# Patient Record
Sex: MALE | Race: WHITE | NOT HISPANIC OR LATINO | Employment: FULL TIME | URBAN - METROPOLITAN AREA
[De-identification: names, ages, dates, MRNs, and addresses within clinical notes are randomized per-mention and may not be internally consistent; named-entity substitution may affect disease eponyms.]

---

## 2017-05-18 ENCOUNTER — ALLSCRIPTS OFFICE VISIT (OUTPATIENT)
Dept: OTHER | Facility: OTHER | Age: 57
End: 2017-05-18

## 2018-01-13 VITALS
HEIGHT: 65 IN | WEIGHT: 140 LBS | HEART RATE: 90 BPM | OXYGEN SATURATION: 98 % | DIASTOLIC BLOOD PRESSURE: 88 MMHG | BODY MASS INDEX: 23.32 KG/M2 | SYSTOLIC BLOOD PRESSURE: 124 MMHG

## 2020-02-05 ENCOUNTER — OFFICE VISIT (OUTPATIENT)
Dept: FAMILY MEDICINE CLINIC | Facility: CLINIC | Age: 60
End: 2020-02-05
Payer: COMMERCIAL

## 2020-02-05 VITALS
HEART RATE: 88 BPM | WEIGHT: 137 LBS | SYSTOLIC BLOOD PRESSURE: 120 MMHG | BODY MASS INDEX: 25.21 KG/M2 | DIASTOLIC BLOOD PRESSURE: 80 MMHG | OXYGEN SATURATION: 97 % | TEMPERATURE: 98.1 F | RESPIRATION RATE: 22 BRPM | HEIGHT: 62 IN

## 2020-02-05 DIAGNOSIS — J01.00 ACUTE NON-RECURRENT MAXILLARY SINUSITIS: Primary | ICD-10-CM

## 2020-02-05 PROCEDURE — 3008F BODY MASS INDEX DOCD: CPT | Performed by: NURSE PRACTITIONER

## 2020-02-05 PROCEDURE — 99213 OFFICE O/P EST LOW 20 MIN: CPT | Performed by: NURSE PRACTITIONER

## 2020-02-05 RX ORDER — AZITHROMYCIN 250 MG/1
TABLET, FILM COATED ORAL
Qty: 6 TABLET | Refills: 0 | Status: SHIPPED | OUTPATIENT
Start: 2020-02-05 | End: 2020-02-09

## 2020-02-05 NOTE — PATIENT INSTRUCTIONS
Discussed viral vs bacterial infection  RX as ordered  Continue OTC cough/cold medications as directed  Call or return for problems/concerns    Schedule appointment for Annual physical as discussed

## 2020-02-05 NOTE — PROGRESS NOTES
[facility]        NAME: Isidra Lang is a 61 y o  male  : 1960    MRN: [id number]  DATE: 2020  TIME: 1:13 PM    Assessment and Plan   Acute non-recurrent maxillary sinusitis [J01 00]  1  Acute non-recurrent maxillary sinusitis  azithromycin (ZITHROMAX) 250 mg tablet         Patient Instructions     Patient Instructions   Discussed viral vs bacterial infection  RX as ordered  Continue OTC cough/cold medications as directed  Call or return for problems/concerns    Schedule appointment for Annual physical as discussed          Chief Complaint     Chief Complaint   Patient presents with    Sinus Problem    Cough         History of Present Illness       Sinus pain and pressure, congestion, occasional cough  More on left side x 6 days  Taking Otc cold medications with little relief      Review of Systems   Review of Systems   Constitutional: Negative for activity change, chills, fatigue and fever  HENT: Positive for congestion, postnasal drip, rhinorrhea, sinus pressure and sinus pain  Negative for ear pain and sore throat  Eyes: Negative for pain, discharge and redness  Respiratory: Positive for cough  Negative for wheezing  Cardiovascular: Negative for chest pain  Gastrointestinal: Negative for constipation, diarrhea, nausea and vomiting  Musculoskeletal: Negative for myalgias  Skin: Negative for rash  Neurological: Positive for headaches  Negative for dizziness           Current Medications       Current Outpatient Medications:     azithromycin (ZITHROMAX) 250 mg tablet, Take 2 tablets today then 1 tablet daily x 4 days, Disp: 6 tablet, Rfl: 0    Current Allergies     Allergies as of 2020 - Reviewed 2020   Allergen Reaction Noted    Penicillins Anaphylaxis 2016            The following portions of the patient's history were reviewed and updated as appropriate: allergies, current medications, past family history, past medical history, past social history, past surgical history and problem list      Past Medical History:   Diagnosis Date    Cardiac disease     Coronary artery disease     Hyperlipidemia     Tobacco dependency        Past Surgical History:   Procedure Laterality Date    CORONARY STENT PLACEMENT      HAND FRACTURE REPAIR Right     HERNIA REPAIR      [place] SHLDR ARTHROSCOP,SURG,W/ROTAT CUFF REPR Right 7/29/2016    Procedure: DIAGNOSTIC SHOULDER ARTHROSCOPY;  Surgeon: Jeffrey Domingo MD;  Location: [facility] OR;  Service: Orthopedics       History reviewed  No pertinent family history  Medications have been verified  Objective   /80 (BP Location: Left arm, Patient Position: Sitting, Cuff Size: Large)   Pulse 88   Temp 98 1 °F (36 7 °C) (Tympanic)   Resp 22   Ht 5' 2 21" (1 58 m)   Wt 62 1 kg (137 lb)   SpO2 97%   BMI 24 89 kg/m²        Physical Exam     Physical Exam   Constitutional: He is oriented to person, place, and time  He appears well-developed and well-nourished  No distress  HENT:   Head: Normocephalic and atraumatic  Right Ear: Tympanic membrane, external ear and ear canal normal    Left Ear: Tympanic membrane, external ear and ear canal normal    Nose: Rhinorrhea present  No epistaxis  Right sinus exhibits maxillary sinus tenderness  Left sinus exhibits maxillary sinus tenderness and frontal sinus tenderness  Mouth/Throat: Uvula is midline, oropharynx is clear and moist and mucous membranes are normal    Cardiovascular: Normal rate, regular rhythm and normal heart sounds  Exam reveals no gallop and no friction rub  No murmur heard  Pulmonary/Chest: Effort normal and breath sounds normal  No respiratory distress  He has no wheezes  He has no rales  Abdominal: He exhibits no distension  Musculoskeletal: Normal range of motion  Neurological: He is alert and oriented to person, place, and time  Skin: He is not diaphoretic  Psychiatric: He has a normal mood and affect   His behavior is normal  Judgment and thought content normal    Nursing note and vitals reviewed

## 2020-09-03 ENCOUNTER — OCCMED (OUTPATIENT)
Dept: URGENT CARE | Facility: CLINIC | Age: 60
End: 2020-09-03
Payer: COMMERCIAL
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
## 2020-09-03 PROCEDURE — 90471 IMMUNIZATION ADMIN: CPT | Performed by: PHYSICIAN ASSISTANT

## 2020-09-03 PROCEDURE — 99213 OFFICE O/P EST LOW 20 MIN: CPT | Performed by: PHYSICIAN ASSISTANT

## 2020-09-03 PROCEDURE — 90715 TDAP VACCINE 7 YRS/> IM: CPT

## 2020-09-10 ENCOUNTER — OCCMED (OUTPATIENT)
Dept: URGENT CARE | Facility: CLINIC | Age: 60
End: 2020-09-10
Payer: COMMERCIAL
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
## 2020-09-10 PROCEDURE — 99213 OFFICE O/P EST LOW 20 MIN: CPT

## 2023-06-06 ENCOUNTER — HOSPITAL ENCOUNTER (EMERGENCY)
Facility: HOSPITAL | Age: 63
Discharge: HOME | End: 2023-06-06
Attending: EMERGENCY MEDICINE | Admitting: EMERGENCY MEDICINE
Payer: COMMERCIAL

## 2023-06-06 VITALS
WEIGHT: 151.68 LBS | TEMPERATURE: 97.5 F | HEART RATE: 81 BPM | DIASTOLIC BLOOD PRESSURE: 84 MMHG | OXYGEN SATURATION: 98 % | RESPIRATION RATE: 21 BRPM | BODY MASS INDEX: 26.87 KG/M2 | SYSTOLIC BLOOD PRESSURE: 127 MMHG

## 2023-06-06 DIAGNOSIS — S46.911A STRAIN OF RIGHT SHOULDER, INITIAL ENCOUNTER: ICD-10-CM

## 2023-06-06 DIAGNOSIS — M54.12 CERVICAL RADICULOPATHY: Primary | ICD-10-CM

## 2023-06-06 LAB — GLUCOSE SERPL-MCNC: 126 MG/DL (ref 65–140)

## 2023-06-06 PROCEDURE — 82948 REAGENT STRIP/BLOOD GLUCOSE: CPT

## 2023-06-06 RX ORDER — LIDOCAINE 50 MG/G
1 PATCH TOPICAL ONCE
Status: DISCONTINUED | OUTPATIENT
Start: 2023-06-06 | End: 2023-06-06 | Stop reason: HOSPADM

## 2023-06-06 RX ADMIN — LIDOCAINE 1 PATCH: 50 PATCH TOPICAL at 08:27

## 2023-06-06 NOTE — DISCHARGE INSTRUCTIONS
You can leave the lidocaine patch on the shoulder for up to 12 hours  If it helps you can buy more over-the-counter  Continue Aleve or ibuprofen as directed  You may also take Tylenol as needed for pain  Avoid activities that cause more pain until this is followed up by your Workmen's Comp  team   See them today  Continue light duty until they clear you

## 2023-06-06 NOTE — Clinical Note
Horace Hook was seen and treated in our emergency department on 6/6/2023  Occupational Medicine Follow Up Within 24 hours        No lifting, pulling, pushing greater than 10 pounds until cleared by healthcare provider  Diagnosis: Right shoulder strain with cervical radiculopathy    Jayden Stark  may return to work on return date  He may return on this date: 06/06/2023         If you have any questions or concerns, please don't hesitate to call        Jason Shah, DO    ______________________________           _______________          _______________  Hospital Representative                              Date                                Time

## 2023-06-06 NOTE — ED PROVIDER NOTES
History  Chief Complaint   Patient presents with   • Numbness     Pt arrived to the ER by POV c/o right hand numbness that began yesterday at approximately 09:30 hrs  He also c/o pain in the right side of his head that radiated to his entire arm  He denied any CP, sob, N/V, dizziness and abdominal pain  60-year-old man with remote right rotator cuff injury, had sudden pain in the right neck down the right arm at work yesterday while lifting a bin of screws  He felt the arm was weak afterwards  He was seen briefly by the medical team there  The pain continues but improved with nonsteroidals  He was concerned that this could be stroke since he has pain from the right occiput down the right neck and down the right arm  He is concerned that the arm still feels weak when trying to lift heavy objects  Denies headache, change in vision, vertigo, other focal neurologic problems  Denies vertigo, nausea, chest pain or palpitations  Patient has prior right rotator cuff tear but still has good use of his arm  He has been able to do his normal activities today but just feels there is some weakness and pain from the shoulder when he lifts something too heavy  None       Past Medical History:   Diagnosis Date   • Cardiac disease    • Coronary artery disease    • Hyperlipidemia    • Tobacco dependency        Past Surgical History:   Procedure Laterality Date   • CORONARY STENT PLACEMENT     • HAND FRACTURE REPAIR Right    • HERNIA REPAIR     • [place] SHLDR ARTHROSCOP,SURG,W/ROTAT CUFF REPR Right 7/29/2016    Procedure: DIAGNOSTIC SHOULDER ARTHROSCOPY;  Surgeon: Shannen Agarwal MD;  Location: [facility] OR;  Service: Orthopedics       No family history on file  I have reviewed and agree with the history as documented      E-Cigarette/Vaping     E-Cigarette/Vaping Substances     Social History     Tobacco Use   • Smoking status: Every Day     Packs/day: 0 50     Types: Cigarettes   • Smokeless tobacco: Never   Substance Use Topics   • Alcohol use: No     Comment: rarely   • Drug use: No       Review of Systems   Constitutional: Negative for fatigue and fever  Respiratory: Negative for shortness of breath  Cardiovascular: Negative for chest pain and palpitations  Gastrointestinal: Negative for abdominal pain  Neurological: Negative for dizziness, tremors, seizures, syncope, facial asymmetry, speech difficulty and headaches  Physical Exam  Physical Exam  Vitals and nursing note reviewed  Constitutional:       General: He is not in acute distress  Appearance: He is well-developed and normal weight  He is not ill-appearing or diaphoretic  HENT:      Head: Normocephalic and atraumatic  Right Ear: External ear normal       Left Ear: External ear normal    Eyes:      Conjunctiva/sclera: Conjunctivae normal       Pupils: Pupils are equal, round, and reactive to light  Neck:      Vascular: No carotid bruit or JVD  Cardiovascular:      Rate and Rhythm: Normal rate and regular rhythm  Heart sounds: Normal heart sounds  No murmur heard  Pulmonary:      Effort: Pulmonary effort is normal       Breath sounds: Normal breath sounds  Abdominal:      General: Bowel sounds are normal       Palpations: Abdomen is soft  There is no mass  Tenderness: There is no guarding or rebound  Musculoskeletal:         General: Normal range of motion  Cervical back: Normal range of motion and neck supple  Tenderness ( Mild tenderness right cervical paraspinal muscles  Tnenderness of right trapezikus and rhomboids  ) present  Lymphadenopathy:      Cervical: No cervical adenopathy  Skin:     General: Skin is warm and dry  Findings: No rash  Neurological:      Mental Status: He is alert and oriented to person, place, and time  Cranial Nerves: No cranial nerve deficit  Coordination: Coordination normal       Deep Tendon Reflexes: Reflexes are normal and symmetric     Psychiatric:         Behavior: Behavior normal          Vital Signs  ED Triage Vitals   Temperature Pulse Respirations Blood Pressure SpO2   06/06/23 0757 06/06/23 0757 06/06/23 0753 06/06/23 0757 06/06/23 0757   97 5 °F (36 4 °C) 87 16 143/82 99 %      Temp src Heart Rate Source Patient Position - Orthostatic VS BP Location FiO2 (%)   -- 06/06/23 0757 06/06/23 0800 06/06/23 0800 --    Monitor Lying Right arm       Pain Score       --                  Vitals:    06/06/23 0758 06/06/23 0800 06/06/23 0815 06/06/23 0830   BP:  143/90 137/89 127/84   Pulse: 83 83 81 81   Patient Position - Orthostatic VS:  Lying Lying Lying         Visual Acuity  Visual Acuity    Flowsheet Row Most Recent Value   L Pupil Size (mm) 2   R Pupil Size (mm) 2          ED Medications  Medications - No data to display    Diagnostic Studies  Results Reviewed     Procedure Component Value Units Date/Time    Fingerstick Glucose (POCT) [[id number]]  (Normal) Collected: 06/06/23 0754    Lab Status: Final result Updated: 06/06/23 0758     POC Glucose 126 mg/dl                  No orders to display              Procedures  Procedures         ED Course                               SBIRT 22yo+    Flowsheet Row Most Recent Value   Initial Alcohol Screen: US AUDIT-C     1  How often do you have a drink containing alcohol? 3 Filed at: 06/06/2023 0802   2  How many drinks containing alcohol do you have on a typical day you are drinking? 1 Filed at: 06/06/2023 0802   3a  Male UNDER 65: How often do you have five or more drinks on one occasion? 0 Filed at: 06/06/2023 0802   3b  FEMALE Any Age, or MALE 65+: How often do you have 4 or more drinks on one occassion? 0 Filed at: 06/06/2023 0802   Audit-C Score 4 Filed at: 06/06/2023 6443   MUKESH: How many times in the past year have you    Used an illegal drug or used a prescription medication for non-medical reasons?  Never Filed at: 06/06/2023 6245                    Medical Decision Making  66-year-old male with pain and tenderness of the right rhomboid, trapezius and right cervical paraspinal muscles after lifting heavy object yesterday  Concerned he may have CVA because he has pain running from neck down to the arm with some weakness in the shoulder  He has no loss of  strength or change in sensation to the fingers  I can reproduced tenderness over a trigger point in the right rhomboids  Patient has some tenderness in trapezius and right cervical paraspinal muscles  He does have good distal strength with slight limitation of shoulder flexion  No sign of central lesion  No signs of infection, thrombosis, ischemia  Reflexes are intact  Symptoms likely d/t peripheral nerve compression, muscle spasm  Cervical radiculopathy: acute illness or injury  Strain of right shoulder, initial encounter: acute illness or injury      Disposition  Final diagnoses:   Cervical radiculopathy   Strain of right shoulder, initial encounter     Time reflects when diagnosis was documented in both MDM as applicable and the Disposition within this note     Time User Action Codes Description Comment    6/6/2023  8:16 AM Jim Alejandro Add [M54 12] Cervical radiculopathy     6/6/2023  8:16 AM Jim Alejandro Add [E59 282S] Strain of right shoulder, initial encounter       ED Disposition     ED Disposition   Discharge    Condition   Stable    Date/Time   Tue Jun 6, 2023  8:16 AM    Comment   Daron Dobbins II discharge to home/self care  Follow-up Information     Follow up With Specialties Details Why Contact Info    Your employer's Workman's Comp  Go today            There are no discharge medications for this patient  No discharge procedures on file      PDMP Review     None          ED Provider  Electronically Signed by           Nafisa Dillard DO  06/07/23 1022

## 2024-09-07 ENCOUNTER — APPOINTMENT (EMERGENCY)
Dept: CT IMAGING | Facility: HOSPITAL | Age: 64
End: 2024-09-07
Payer: COMMERCIAL

## 2024-09-07 ENCOUNTER — HOSPITAL ENCOUNTER (EMERGENCY)
Facility: HOSPITAL | Age: 64
Discharge: HOME | End: 2024-09-07
Attending: EMERGENCY MEDICINE
Payer: COMMERCIAL

## 2024-09-07 VITALS
HEART RATE: 76 BPM | DIASTOLIC BLOOD PRESSURE: 83 MMHG | OXYGEN SATURATION: 98 % | TEMPERATURE: 98.3 F | RESPIRATION RATE: 18 BRPM | SYSTOLIC BLOOD PRESSURE: 129 MMHG

## 2024-09-07 DIAGNOSIS — R31.9 HEMATURIA: Primary | ICD-10-CM

## 2024-09-07 DIAGNOSIS — K86.1 CHRONIC PANCREATITIS (HCC): ICD-10-CM

## 2024-09-07 DIAGNOSIS — N40.0 ENLARGED PROSTATE: ICD-10-CM

## 2024-09-07 DIAGNOSIS — N39.0 UTI (URINARY TRACT INFECTION): ICD-10-CM

## 2024-09-07 DIAGNOSIS — K80.20 GALLSTONES: ICD-10-CM

## 2024-09-07 LAB
ALBUMIN SERPL BCG-MCNC: 4.8 G/DL (ref 3.5–5)
ALP SERPL-CCNC: 54 U/L (ref 34–104)
ALT SERPL W P-5'-P-CCNC: 19 U/L (ref 7–52)
ANION GAP SERPL CALCULATED.3IONS-SCNC: 7 MMOL/L (ref 4–13)
AST SERPL W P-5'-P-CCNC: 19 U/L (ref 13–39)
BACTERIA UR QL AUTO: ABNORMAL /HPF
BASOPHILS # BLD AUTO: 0.02 THOUSANDS/ÂΜL (ref 0–0.1)
BASOPHILS NFR BLD AUTO: 0 % (ref 0–1)
BILIRUB SERPL-MCNC: 0.92 MG/DL (ref 0.2–1)
BILIRUB UR QL STRIP: NEGATIVE
BUN SERPL-MCNC: 18 MG/DL (ref 5–25)
CALCIUM SERPL-MCNC: 9.8 MG/DL (ref 8.4–10.2)
CHLORIDE SERPL-SCNC: 107 MMOL/L (ref 96–108)
CLARITY UR: ABNORMAL
CO2 SERPL-SCNC: 25 MMOL/L (ref 21–32)
COLOR UR: YELLOW
CREAT SERPL-MCNC: 0.99 MG/DL (ref 0.6–1.3)
EOSINOPHIL # BLD AUTO: 0.15 THOUSAND/ÂΜL (ref 0–0.61)
EOSINOPHIL NFR BLD AUTO: 2 % (ref 0–6)
ERYTHROCYTE [DISTWIDTH] IN BLOOD BY AUTOMATED COUNT: 13.1 % (ref 11.6–15.1)
GFR SERPL CREATININE-BSD FRML MDRD: 80 ML/MIN/1.73SQ M
GLUCOSE SERPL-MCNC: 93 MG/DL (ref 65–140)
GLUCOSE UR STRIP-MCNC: NEGATIVE MG/DL
HCT VFR BLD AUTO: 46.2 % (ref 36.5–49.3)
HGB BLD-MCNC: 15.7 G/DL (ref 12–17)
HGB UR QL STRIP.AUTO: ABNORMAL
HYALINE CASTS #/AREA URNS LPF: ABNORMAL /LPF
IMM GRANULOCYTES # BLD AUTO: 0.06 THOUSAND/UL (ref 0–0.2)
IMM GRANULOCYTES NFR BLD AUTO: 1 % (ref 0–2)
KETONES UR STRIP-MCNC: NEGATIVE MG/DL
LEUKOCYTE ESTERASE UR QL STRIP: NEGATIVE
LYMPHOCYTES # BLD AUTO: 1.7 THOUSANDS/ÂΜL (ref 0.6–4.47)
LYMPHOCYTES NFR BLD AUTO: 19 % (ref 14–44)
MCH RBC QN AUTO: 32 PG (ref 26.8–34.3)
MCHC RBC AUTO-ENTMCNC: 34 G/DL (ref 31.4–37.4)
MCV RBC AUTO: 94 FL (ref 82–98)
MONOCYTES # BLD AUTO: 0.61 THOUSAND/ÂΜL (ref 0.17–1.22)
MONOCYTES NFR BLD AUTO: 7 % (ref 4–12)
MUCOUS THREADS UR QL AUTO: ABNORMAL
NEUTROPHILS # BLD AUTO: 6.67 THOUSANDS/ÂΜL (ref 1.85–7.62)
NEUTS SEG NFR BLD AUTO: 71 % (ref 43–75)
NITRITE UR QL STRIP: NEGATIVE
NON-SQ EPI CELLS URNS QL MICRO: ABNORMAL /HPF
NRBC BLD AUTO-RTO: 0 /100 WBCS
PH UR STRIP.AUTO: 8 [PH]
PLATELET # BLD AUTO: 351 THOUSANDS/UL (ref 149–390)
PMV BLD AUTO: 8.4 FL (ref 8.9–12.7)
POTASSIUM SERPL-SCNC: 4.2 MMOL/L (ref 3.5–5.3)
PROT SERPL-MCNC: 7.5 G/DL (ref 6.4–8.4)
PROT UR STRIP-MCNC: ABNORMAL MG/DL
RBC # BLD AUTO: 4.9 MILLION/UL (ref 3.88–5.62)
RBC #/AREA URNS AUTO: ABNORMAL /HPF
SODIUM SERPL-SCNC: 139 MMOL/L (ref 135–147)
SP GR UR STRIP.AUTO: 1.02 (ref 1–1.03)
UROBILINOGEN UR STRIP-ACNC: <2 MG/DL
WBC # BLD AUTO: 9.21 THOUSAND/UL (ref 4.31–10.16)
WBC #/AREA URNS AUTO: ABNORMAL /HPF

## 2024-09-07 PROCEDURE — 99284 EMERGENCY DEPT VISIT MOD MDM: CPT

## 2024-09-07 PROCEDURE — 99284 EMERGENCY DEPT VISIT MOD MDM: CPT | Performed by: PHYSICIAN ASSISTANT

## 2024-09-07 PROCEDURE — 36415 COLL VENOUS BLD VENIPUNCTURE: CPT | Performed by: EMERGENCY MEDICINE

## 2024-09-07 PROCEDURE — 74176 CT ABD & PELVIS W/O CONTRAST: CPT

## 2024-09-07 PROCEDURE — 81001 URINALYSIS AUTO W/SCOPE: CPT | Performed by: EMERGENCY MEDICINE

## 2024-09-07 PROCEDURE — 80053 COMPREHEN METABOLIC PANEL: CPT | Performed by: EMERGENCY MEDICINE

## 2024-09-07 PROCEDURE — 85025 COMPLETE CBC W/AUTO DIFF WBC: CPT | Performed by: EMERGENCY MEDICINE

## 2024-09-07 RX ORDER — NITROFURANTOIN 25; 75 MG/1; MG/1
100 CAPSULE ORAL ONCE
Status: COMPLETED | OUTPATIENT
Start: 2024-09-07 | End: 2024-09-07

## 2024-09-07 RX ORDER — NITROFURANTOIN 25; 75 MG/1; MG/1
100 CAPSULE ORAL 2 TIMES DAILY
Qty: 10 CAPSULE | Refills: 0 | Status: SHIPPED | OUTPATIENT
Start: 2024-09-07 | End: 2024-09-12

## 2024-09-07 RX ORDER — SULFAMETHOXAZOLE/TRIMETHOPRIM 800-160 MG
1 TABLET ORAL ONCE
Status: DISCONTINUED | OUTPATIENT
Start: 2024-09-07 | End: 2024-09-07

## 2024-09-07 RX ADMIN — NITROFURANTOIN MONOHYDRATE/MACROCRYSTALLINE 100 MG: 25; 75 CAPSULE ORAL at 17:00

## 2024-09-07 NOTE — DISCHARGE INSTRUCTIONS
Rest, increase fluids.  Take antibiotic as directed.  Follow up with urologist for further evaluation of enlarged prostate and hematuria.

## 2024-09-07 NOTE — ED PROVIDER NOTES
"History  Chief Complaint   Patient presents with    Blood in Urine     Patient states he's been passing blood in his urine starting last week. Patient also states \"particles came out\". No other urinary complaints . No fevers or chills      Patient is a 65 y/o M with h/o CAD that presents to the ED with hematuria that started 5 days ago.  He states he noticed blood clots in urine 5 days ago, but it went away, but came back today.  No fevers/chills.  No back pain, nausea or vomiting.  No dysuria.  Patient states he noticed particles in his urine.  No prior history of UTIs or kidney stones.       History provided by:  Patient  Blood in Urine  Irritative symptoms do not include urgency. Pertinent negatives include no abdominal pain, chills, dysuria, fever, nausea or vomiting.       None       Past Medical History:   Diagnosis Date    Cardiac disease     Coronary artery disease     Hyperlipidemia     Tobacco dependency        Past Surgical History:   Procedure Laterality Date    CORONARY STENT PLACEMENT      HAND FRACTURE REPAIR Right     HERNIA REPAIR      [place] SURGICAL ARTHROSCOPY SHOULDER W/ROTATOR CUFF RPR Right 7/29/2016    Procedure: DIAGNOSTIC SHOULDER ARTHROSCOPY;  Surgeon: Suzi Brunson MD;  Location: [facility];  Service: Orthopedics       History reviewed. No pertinent family history.  I have reviewed and agree with the history as documented.    E-Cigarette/Vaping     E-Cigarette/Vaping Substances     Social History     Tobacco Use    Smoking status: Every Day     Current packs/day: 0.50     Types: Cigarettes    Smokeless tobacco: Never   Substance Use Topics    Alcohol use: No     Comment: rarely    Drug use: No       Review of Systems   Constitutional:  Negative for chills and fever.   HENT: Negative.     Respiratory:  Negative for cough and shortness of breath.    Gastrointestinal:  Negative for abdominal pain, diarrhea, nausea and vomiting.   Genitourinary:  Positive for hematuria. Negative for dysuria and " urgency.   Musculoskeletal:  Negative for back pain and neck pain.   Skin:  Negative for color change and rash.   Neurological:  Negative for dizziness, speech difficulty, weakness, light-headedness and numbness.   Psychiatric/Behavioral:  Negative for confusion.    All other systems reviewed and are negative.      Physical Exam  Physical Exam  Vitals and nursing note reviewed.   Constitutional:       General: He is not in acute distress.     Appearance: Normal appearance. He is well-developed and well-groomed. He is not ill-appearing or diaphoretic.   HENT:      Head: Normocephalic and atraumatic.      Nose: Nose normal.   Eyes:      Conjunctiva/sclera: Conjunctivae normal.   Cardiovascular:      Rate and Rhythm: Normal rate and regular rhythm.      Heart sounds: Normal heart sounds.   Pulmonary:      Effort: Pulmonary effort is normal.      Breath sounds: Normal breath sounds. No wheezing, rhonchi or rales.   Abdominal:      General: Abdomen is flat. Bowel sounds are normal.      Palpations: Abdomen is soft.      Tenderness: There is no abdominal tenderness. There is no right CVA tenderness or left CVA tenderness.   Musculoskeletal:         General: Normal range of motion.      Cervical back: Normal range of motion.      Right lower leg: No edema.      Left lower leg: No edema.   Skin:     General: Skin is warm and dry.   Neurological:      General: No focal deficit present.      Mental Status: He is alert and oriented to person, place, and time.      Motor: No weakness.   Psychiatric:         Mood and Affect: Mood normal.         Behavior: Behavior is cooperative.         Vital Signs  ED Triage Vitals [09/07/24 1223]   Temperature Pulse Respirations Blood Pressure SpO2   98.3 °F (36.8 °C) 85 18 137/83 98 %      Temp Source Heart Rate Source Patient Position - Orthostatic VS BP Location FiO2 (%)   Oral Monitor Sitting Right arm --      Pain Score       --           Vitals:    09/07/24 1223 09/07/24 1555 09/07/24  1630 09/07/24 1700   BP: 137/83 126/76  129/83   Pulse: 85 66 65 76   Patient Position - Orthostatic VS: Sitting Lying           Visual Acuity      ED Medications  Medications   nitrofurantoin (MACROBID) extended-release capsule 100 mg (100 mg Oral Given 9/7/24 1700)       Diagnostic Studies  Results Reviewed       Procedure Component Value Units Date/Time    Urine Microscopic [[id number]]  (Abnormal) Collected: 09/07/24 1457    Lab Status: Final result Specimen: Urine, Clean Catch Updated: 09/07/24 1540     RBC, UA 30-50 /hpf      WBC, UA 4-10 /hpf      Epithelial Cells None Seen /hpf      Bacteria, UA Occasional /hpf      MUCUS THREADS Moderate     Hyaline Casts, UA 1-2 /lpf     UA w Reflex to Microscopic w Reflex to Culture [[id number]]  (Abnormal) Collected: 09/07/24 1457    Lab Status: Final result Specimen: Urine, Clean Catch Updated: 09/07/24 1539     Color, UA Yellow     Clarity, UA Slightly Cloudy     Specific Gravity, UA 1.020     pH, UA 8.0     Leukocytes, UA Negative     Nitrite, UA Negative     Protein, UA Trace mg/dl      Glucose, UA Negative mg/dl      Ketones, UA Negative mg/dl      Urobilinogen, UA <2.0 mg/dl      Bilirubin, UA Negative     Occult Blood, UA Large    Comprehensive metabolic panel [[id number]] Collected: 09/07/24 1457    Lab Status: Final result Specimen: Blood from Arm, Left Updated: 09/07/24 1527     Sodium 139 mmol/L      Potassium 4.2 mmol/L      Chloride 107 mmol/L      CO2 25 mmol/L      ANION GAP 7 mmol/L      BUN 18 mg/dL      Creatinine 0.99 mg/dL      Glucose 93 mg/dL      Calcium 9.8 mg/dL      AST 19 U/L      ALT 19 U/L      Alkaline Phosphatase 54 U/L      Total Protein 7.5 g/dL      Albumin 4.8 g/dL      Total Bilirubin 0.92 mg/dL      eGFR 80 ml/min/1.73sq m     Narrative:      National Kidney Disease Foundation guidelines for Chronic Kidney Disease (CKD):     Stage 1 with normal or high GFR (GFR > 90 mL/min/1.73 square meters)    Stage 2 Mild CKD (GFR = 60-89 mL/min/1.73  square meters)    Stage 3A Moderate CKD (GFR = 45-59 mL/min/1.73 square meters)    Stage 3B Moderate CKD (GFR = 30-44 mL/min/1.73 square meters)    Stage 4 Severe CKD (GFR = 15-29 mL/min/1.73 square meters)    Stage 5 End Stage CKD (GFR <15 mL/min/1.73 square meters)  Note: GFR calculation is accurate only with a steady state creatinine    CBC and differential [[id number]]  (Abnormal) Collected: 09/07/24 1457    Lab Status: Final result Specimen: Blood from Arm, Left Updated: 09/07/24 1510     WBC 9.21 Thousand/uL      RBC 4.90 Million/uL      Hemoglobin 15.7 g/dL      Hematocrit 46.2 %      MCV 94 fL      MCH 32.0 pg      MCHC 34.0 g/dL      RDW 13.1 %      MPV 8.4 fL      Platelets 351 Thousands/uL      nRBC 0 /100 WBCs      Segmented % 71 %      Immature Grans % 1 %      Lymphocytes % 19 %      Monocytes % 7 %      Eosinophils Relative 2 %      Basophils Relative 0 %      Absolute Neutrophils 6.67 Thousands/µL      Absolute Immature Grans 0.06 Thousand/uL      Absolute Lymphocytes 1.70 Thousands/µL      Absolute Monocytes 0.61 Thousand/µL      Eosinophils Absolute 0.15 Thousand/µL      Basophils Absolute 0.02 Thousands/µL                    CT renal stone study abdomen pelvis without contrast   Final Result by Paolo Wylie MD (09/07 1636)      No acute pathology. Specifically, no urinary tract calculi or obstructive uropathy.      Cholelithiasis.      Chronic pancreatitis.      Colonic diverticulosis.      Prostatomegaly.         Workstation performed: [id number]                    Procedures  Procedures         ED Course                                 SBIRT 22yo+      Flowsheet Row Most Recent Value   Initial Alcohol Screen: US AUDIT-C     1. How often do you have a drink containing alcohol? 3 Filed at: 09/07/2024 1223   2. How many drinks containing alcohol do you have on a typical day you are drinking?  0 Filed at: 09/07/2024 1223   3a. Male UNDER 65: How often do you have five or more drinks on one  occasion? 0 Filed at: 09/07/2024 1223   3b. FEMALE Any Age, or MALE 65+: How often do you have 4 or more drinks on one occassion? 0 Filed at: 09/07/2024 1223   Audit-C Score 3 Filed at: 09/07/2024 1223                      Medical Decision Making  Patient with hematuria, will order labs, CT scan to r/o UTI, obstructing kidney stone.   Patient with UTI, will start on macrobid, urine culture pending.  Patient with multiple incidental findings on CT scan, advised f/u with urology for hematuria, enlarged prostate.      Amount and/or Complexity of Data Reviewed  Labs: ordered.  Radiology: ordered.    Risk  Prescription drug management.                 Disposition  Final diagnoses:   Hematuria   UTI (urinary tract infection)   Enlarged prostate   Gallstones   Chronic pancreatitis (HCC)     Time reflects when diagnosis was documented in both MDM as applicable and the Disposition within this note       Time User Action Codes Description Comment    9/7/2024  4:46 PM Lulú Trivedi Add [R31.9] Hematuria     9/7/2024  4:46 PM Lulú Trivedi Add [N39.0] UTI (urinary tract infection)     9/7/2024  4:46 PM Lulú Trivedi Add [N40.0] Enlarged prostate     9/7/2024  4:46 PM Lulú Trivedi Add [K80.20] Gallstones     9/7/2024  4:46 PM Lulú Trivedi Add [K86.1] Chronic pancreatitis (HCC)           ED Disposition       ED Disposition   Discharge    Condition   Stable    Date/Time   Sat Sep 7, 2024 1647    Comment   Tyrese Driscoll II discharge to home/self care.                   Follow-up Information       Follow up With Specialties Details Why Contact Info Additional Information    [facility] Schedule an appointment as soon as possible for a visit  For further evaluation of hematuria and enlarged prostate [address]  [address]  [facility] [address]  [phone] [facility], [address], [facility] [address], [place], [place],  [address]   [phone]            Discharge Medication List as of 9/7/2024  4:53 PM        START taking these medications    Details   nitrofurantoin (MACROBID) 100 mg capsule Take 1 capsule (100 mg total) by mouth 2 (two) times a day for 5 days, Starting Sat 9/7/2024, Until Thu 9/12/2024, Normal             No discharge procedures on file.    PDMP Review       None            ED Provider  Electronically Signed by             Lulú Trivedi PA-C  09/07/24 7315

## 2024-09-12 ENCOUNTER — TELEPHONE (OUTPATIENT)
Age: 64
End: 2024-09-12

## 2024-09-12 ENCOUNTER — HOSPITAL ENCOUNTER (EMERGENCY)
Facility: HOSPITAL | Age: 64
Discharge: HOME | End: 2024-09-13
Attending: EMERGENCY MEDICINE
Payer: COMMERCIAL

## 2024-09-12 ENCOUNTER — APPOINTMENT (EMERGENCY)
Dept: CT IMAGING | Facility: HOSPITAL | Age: 64
End: 2024-09-12
Payer: COMMERCIAL

## 2024-09-12 VITALS
TEMPERATURE: 98.6 F | DIASTOLIC BLOOD PRESSURE: 58 MMHG | SYSTOLIC BLOOD PRESSURE: 104 MMHG | HEART RATE: 72 BPM | OXYGEN SATURATION: 100 % | RESPIRATION RATE: 16 BRPM

## 2024-09-12 DIAGNOSIS — N39.0 UTI (URINARY TRACT INFECTION): ICD-10-CM

## 2024-09-12 DIAGNOSIS — R31.9 HEMATURIA: ICD-10-CM

## 2024-09-12 DIAGNOSIS — R53.1 GENERALIZED WEAKNESS: Primary | ICD-10-CM

## 2024-09-12 DIAGNOSIS — N39.0 URINARY TRACT INFECTION: ICD-10-CM

## 2024-09-12 LAB
ALBUMIN SERPL BCG-MCNC: 4.7 G/DL (ref 3.5–5)
ALP SERPL-CCNC: 55 U/L (ref 34–104)
ALT SERPL W P-5'-P-CCNC: 19 U/L (ref 7–52)
ANION GAP SERPL CALCULATED.3IONS-SCNC: 10 MMOL/L (ref 4–13)
AST SERPL W P-5'-P-CCNC: 21 U/L (ref 13–39)
BACTERIA UR QL AUTO: ABNORMAL /HPF
BASOPHILS # BLD AUTO: 0.02 THOUSANDS/ΜL (ref 0–0.1)
BASOPHILS NFR BLD AUTO: 0 % (ref 0–1)
BILIRUB SERPL-MCNC: 0.98 MG/DL (ref 0.2–1)
BILIRUB UR QL STRIP: NEGATIVE
BUN SERPL-MCNC: 19 MG/DL (ref 5–25)
CALCIUM SERPL-MCNC: 9.7 MG/DL (ref 8.4–10.2)
CARDIAC TROPONIN I PNL SERPL HS: 4 NG/L
CHLORIDE SERPL-SCNC: 104 MMOL/L (ref 96–108)
CLARITY UR: ABNORMAL
CO2 SERPL-SCNC: 23 MMOL/L (ref 21–32)
COLOR UR: ABNORMAL
CREAT SERPL-MCNC: 1.01 MG/DL (ref 0.6–1.3)
EOSINOPHIL # BLD AUTO: 0.09 THOUSAND/ΜL (ref 0–0.61)
EOSINOPHIL NFR BLD AUTO: 1 % (ref 0–6)
ERYTHROCYTE [DISTWIDTH] IN BLOOD BY AUTOMATED COUNT: 12.9 % (ref 11.6–15.1)
GFR SERPL CREATININE-BSD FRML MDRD: 78 ML/MIN/1.73SQ M
GLUCOSE SERPL-MCNC: 94 MG/DL (ref 65–140)
GLUCOSE UR STRIP-MCNC: NEGATIVE MG/DL
HCT VFR BLD AUTO: 43 % (ref 36.5–49.3)
HGB BLD-MCNC: 15 G/DL (ref 12–17)
HGB UR QL STRIP.AUTO: ABNORMAL
IMM GRANULOCYTES # BLD AUTO: 0.05 THOUSAND/UL (ref 0–0.2)
IMM GRANULOCYTES NFR BLD AUTO: 1 % (ref 0–2)
KETONES UR STRIP-MCNC: NEGATIVE MG/DL
LEUKOCYTE ESTERASE UR QL STRIP: ABNORMAL
LYMPHOCYTES # BLD AUTO: 1.93 THOUSANDS/ΜL (ref 0.6–4.47)
LYMPHOCYTES NFR BLD AUTO: 21 % (ref 14–44)
MCH RBC QN AUTO: 32.3 PG (ref 26.8–34.3)
MCHC RBC AUTO-ENTMCNC: 34.9 G/DL (ref 31.4–37.4)
MCV RBC AUTO: 93 FL (ref 82–98)
MONOCYTES # BLD AUTO: 0.68 THOUSAND/ΜL (ref 0.17–1.22)
MONOCYTES NFR BLD AUTO: 7 % (ref 4–12)
MUCOUS THREADS UR QL AUTO: ABNORMAL
NEUTROPHILS # BLD AUTO: 6.63 THOUSANDS/ΜL (ref 1.85–7.62)
NEUTS SEG NFR BLD AUTO: 70 % (ref 43–75)
NITRITE UR QL STRIP: NEGATIVE
NON-SQ EPI CELLS URNS QL MICRO: ABNORMAL /HPF
NRBC BLD AUTO-RTO: 0 /100 WBCS
PH UR STRIP.AUTO: 6.5 [PH]
PLATELET # BLD AUTO: 313 THOUSANDS/UL (ref 149–390)
PMV BLD AUTO: 8.4 FL (ref 8.9–12.7)
POTASSIUM SERPL-SCNC: 3.8 MMOL/L (ref 3.5–5.3)
PROT SERPL-MCNC: 7.2 G/DL (ref 6.4–8.4)
PROT UR STRIP-MCNC: ABNORMAL MG/DL
RBC # BLD AUTO: 4.65 MILLION/UL (ref 3.88–5.62)
RBC #/AREA URNS AUTO: ABNORMAL /HPF
SODIUM SERPL-SCNC: 137 MMOL/L (ref 135–147)
SP GR UR STRIP.AUTO: 1.02 (ref 1–1.03)
UROBILINOGEN UR STRIP-ACNC: <2 MG/DL
WBC # BLD AUTO: 9.4 THOUSAND/UL (ref 4.31–10.16)
WBC #/AREA URNS AUTO: ABNORMAL /HPF

## 2024-09-12 PROCEDURE — 81001 URINALYSIS AUTO W/SCOPE: CPT | Performed by: EMERGENCY MEDICINE

## 2024-09-12 PROCEDURE — 99285 EMERGENCY DEPT VISIT HI MDM: CPT | Performed by: EMERGENCY MEDICINE

## 2024-09-12 PROCEDURE — 99285 EMERGENCY DEPT VISIT HI MDM: CPT

## 2024-09-12 PROCEDURE — 93005 ELECTROCARDIOGRAM TRACING: CPT

## 2024-09-12 PROCEDURE — 85025 COMPLETE CBC W/AUTO DIFF WBC: CPT | Performed by: EMERGENCY MEDICINE

## 2024-09-12 PROCEDURE — 84484 ASSAY OF TROPONIN QUANT: CPT | Performed by: EMERGENCY MEDICINE

## 2024-09-12 PROCEDURE — 80053 COMPREHEN METABOLIC PANEL: CPT | Performed by: EMERGENCY MEDICINE

## 2024-09-12 PROCEDURE — 36415 COLL VENOUS BLD VENIPUNCTURE: CPT

## 2024-09-12 PROCEDURE — 74177 CT ABD & PELVIS W/CONTRAST: CPT

## 2024-09-12 RX ADMIN — IOHEXOL 100 ML: 350 INJECTION, SOLUTION INTRAVENOUS at 22:48

## 2024-09-12 NOTE — TELEPHONE ENCOUNTER
Complaint/Diagnosis:Blood in urine with clots     Insurance:BC     History of cancer:no     Previous urologist:no     Outside testing/where:[facility]     If yes what kind:[facility]     Records requested:epic     Preferred location:[facility]

## 2024-09-12 NOTE — TELEPHONE ENCOUNTER
Spoke to patient to triage his ongoing gross hematuria. He states every time he voids it is is dark red blood with clots and now now he is weak and lethargic. He is not on any anticoagulants. He is having difficulty passing urine at times. He denies any other symptoms. He is scheduled with urology as a new patient on 10/7/2024. He is not yet established with our office. I explained he needs to go to ER due to ongoing gross hematuria for the past four days and is still bleeding after antibiotic regimen,  and for feeling lethargic and weak. I offered him assistance to get to the ER safely and he stated he is fine to go by himself. He agreed to go to ER.

## 2024-09-12 NOTE — TELEPHONE ENCOUNTER
Npt calling with blood in urine with clots he had presented to  UB 09/07/24 the first available urgent for [facility] 10/07/24 please contact for further triage.

## 2024-09-12 NOTE — TELEPHONE ENCOUNTER
Left a voicemail to have patient call and speak to clinical triage and schedule an appointment. Office phone number given.

## 2024-09-13 LAB
ATRIAL RATE: 82 BPM
P AXIS: 66 DEGREES
PR INTERVAL: 120 MS
QRS AXIS: 30 DEGREES
QRSD INTERVAL: 108 MS
QT INTERVAL: 368 MS
QTC INTERVAL: 429 MS
T WAVE AXIS: 56 DEGREES
VENTRICULAR RATE: 82 BPM

## 2024-09-13 PROCEDURE — 93010 ELECTROCARDIOGRAM REPORT: CPT | Performed by: INTERNAL MEDICINE

## 2024-09-13 RX ORDER — CEFUROXIME AXETIL 250 MG/1
500 TABLET ORAL ONCE
Status: DISCONTINUED | OUTPATIENT
Start: 2024-09-13 | End: 2024-09-13

## 2024-09-13 RX ORDER — CIPROFLOXACIN 500 MG/1
500 TABLET, FILM COATED ORAL 2 TIMES DAILY
Qty: 14 TABLET | Refills: 0 | Status: SHIPPED | OUTPATIENT
Start: 2024-09-13 | End: 2024-09-20

## 2024-09-13 RX ORDER — CIPROFLOXACIN 500 MG/1
500 TABLET, FILM COATED ORAL ONCE
Status: COMPLETED | OUTPATIENT
Start: 2024-09-13 | End: 2024-09-13

## 2024-09-13 RX ADMIN — CIPROFLOXACIN 500 MG: 500 TABLET ORAL at 00:59

## 2024-09-13 NOTE — DISCHARGE INSTRUCTIONS
Please follow-up with urology for further care, if symptoms worsen please return to the Emergency Department

## 2024-09-13 NOTE — ED PROVIDER NOTES
1. Generalized weakness    2. Urinary tract infection    3. Hematuria    4. UTI (urinary tract infection)      ED Disposition       ED Disposition   Discharge    Condition   Stable    Date/Time   Fri Sep 13, 2024 12:43 AM    Comment   Tyrese Driscoll II discharge to home/self care.                   Assessment & Plan       Medical Decision Making  64-year-old male with gross hematuria differential diagnosis includes urinary tract infection, bladder lesion, bladder malignancy, ureterolithiasis, nephrolithiasis, pyelonephritis    Amount and/or Complexity of Data Reviewed  Labs: ordered.  Radiology: ordered.    Risk  Prescription drug management.                  ED Course as of 09/13/24 0054   Thu Sep 12, 2024   2114 Previously seen for hematuria, finished course of antibiotics, continued to have hematuria now with generalized weakness, tried calling the urology office for expedited appointment however has an appointment scheduled 10/7 for a new patient visit, was encouraged to go to the ER for ongoing hematuria   Fri Sep 13, 2024   0053 Comfortably no acute complaints, afebrile with no vomiting, does have mild cystitis, able to urinate without difficulty with no retention, hemoglobin stable, stitch sent to urology using [employer] secure chat to establish expedited follow-up for the patient otherwise careful return precautions discussed with the patient stable for discharge at this time no indication for further patient evaluation and treatment   0054 Discussed with the patient to stop Macrobid, he has 1 pill left       Medications   ciprofloxacin (CIPRO) tablet 500 mg (has no administration in time range)   iohexol (OMNIPAQUE) 350 MG/ML injection (SINGLE-DOSE) 100 mL (100 mL Intravenous Given 9/12/24 5415)       History of Present Illness       64-year-old male with a week of gross hematuria, sometimes passing clots, did initially get seen in the emergency department, diagnosed with a urinary tract infection, started on  antibiotics which he has 1 day of left.  He states that the hematuria continued he tried making appointment with urology but the soonest appointment that he can make for him was October 7.  No current antiplatelet or anticoagulant use.  No flank or back pain.            Review of Systems   Constitutional:  Negative for appetite change, chills and fever.   HENT:  Negative for rhinorrhea and sore throat.    Eyes:  Negative for photophobia and visual disturbance.   Respiratory:  Negative for cough and shortness of breath.    Cardiovascular:  Negative for chest pain and palpitations.   Gastrointestinal:  Negative for abdominal pain and diarrhea.   Genitourinary:  Positive for hematuria. Negative for dysuria, frequency and urgency.   Skin:  Negative for rash.   Neurological:  Negative for dizziness and weakness.   All other systems reviewed and are negative.          Objective     ED Triage Vitals [09/12/24 1802]   Temperature Pulse Blood Pressure Respirations SpO2 Patient Position - Orthostatic VS   98.6 °F (37 °C) 87 151/98 16 99 % Sitting      Temp Source Heart Rate Source BP Location FiO2 (%) Pain Score    Temporal -- Right arm -- No Pain        Physical Exam  Vitals and nursing note reviewed.   Constitutional:       Appearance: He is well-developed.   HENT:      Head: Normocephalic and atraumatic.      Right Ear: External ear normal.      Left Ear: External ear normal.      Mouth/Throat:      Mouth: Oropharynx is clear and moist.   Eyes:      Extraocular Movements: EOM normal.      Conjunctiva/sclera: Conjunctivae normal.      Pupils: Pupils are equal, round, and reactive to light.   Neck:      Vascular: No JVD.      Trachea: No tracheal deviation.   Cardiovascular:      Rate and Rhythm: Normal rate and regular rhythm.      Heart sounds: Normal heart sounds. No murmur heard.     No friction rub. No gallop.   Pulmonary:      Effort: Pulmonary effort is normal. No respiratory distress.      Breath sounds: No stridor.  No wheezing or rales.   Abdominal:      General: There is no distension.      Palpations: Abdomen is soft. There is no mass.      Tenderness: There is no abdominal tenderness. There is no right CVA tenderness, left CVA tenderness, guarding or rebound.   Musculoskeletal:         General: No edema. Normal range of motion.      Cervical back: Normal range of motion and neck supple.   Skin:     General: Skin is warm and dry.      Coloration: Skin is not pale.      Findings: No erythema or rash.   Neurological:      Mental Status: He is alert and oriented to person, place, and time.      Cranial Nerves: No cranial nerve deficit.   Psychiatric:         Mood and Affect: Mood and affect normal.         Labs Reviewed   CBC AND DIFFERENTIAL - Abnormal       Result Value    WBC 9.40      RBC 4.65      Hemoglobin 15.0      Hematocrit 43.0      MCV 93      MCH 32.3      MCHC 34.9      RDW 12.9      MPV 8.4 (*)     Platelets 313      nRBC 0      Segmented % 70      Immature Grans % 1      Lymphocytes % 21      Monocytes % 7      Eosinophils Relative 1      Basophils Relative 0      Absolute Neutrophils 6.63      Absolute Immature Grans 0.05      Absolute Lymphocytes 1.93      Absolute Monocytes 0.68      Eosinophils Absolute 0.09      Basophils Absolute 0.02     UA W REFLEX TO MICROSCOPIC WITH REFLEX TO CULTURE - Abnormal    Color, UA Dark Red      Clarity, UA Cloudy      Specific Gravity, UA 1.020      pH, UA 6.5      Leukocytes, UA Moderate (*)     Nitrite, UA Negative      Protein,  (2+) (*)     Glucose, UA Negative      Ketones, UA Negative      Urobilinogen, UA <2.0      Bilirubin, UA Negative      Occult Blood, UA Large (*)    URINE MICROSCOPIC - Abnormal    RBC, UA Innumerable (*)     WBC, UA 4-10 (*)     Epithelial Cells Occasional      Bacteria, UA Moderate (*)     MUCUS THREADS Occasional (*)    HS TROPONIN I 0HR - Normal    hs TnI 0hr 4     COMPREHENSIVE METABOLIC PANEL    Sodium 137      Potassium 3.8       Chloride 104      CO2 23      ANION GAP 10      BUN 19      Creatinine 1.01      Glucose 94      Calcium 9.7      AST 21      ALT 19      Alkaline Phosphatase 55      Total Protein 7.2      Albumin 4.7      Total Bilirubin 0.98      eGFR 78      Narrative:     National Kidney Disease Foundation guidelines for Chronic Kidney Disease (CKD):     Stage 1 with normal or high GFR (GFR > 90 mL/min/1.73 square meters)    Stage 2 Mild CKD (GFR = 60-89 mL/min/1.73 square meters)    Stage 3A Moderate CKD (GFR = 45-59 mL/min/1.73 square meters)    Stage 3B Moderate CKD (GFR = 30-44 mL/min/1.73 square meters)    Stage 4 Severe CKD (GFR = 15-29 mL/min/1.73 square meters)    Stage 5 End Stage CKD (GFR <15 mL/min/1.73 square meters)  Note: GFR calculation is accurate only with a steady state creatinine     CT abdomen pelvis with contrast   Final Interpretation by Mervin Adler DO (09/13 0035)      Mild bladder wall thickening which could be on the basis of chronic bladder outlet obstruction from an enlarged prostate, however correlate clinically and with urinalysis to evaluate for possible mild cystitis.      Otherwise, no acute process within the abdomen or pelvis.      Stable appearance of chronic pancreatitis and other findings as detailed above.         Workstation performed: [id number]             Peter Frank DO  09/13/24 0054

## 2024-10-01 NOTE — PROGRESS NOTES
Ambulatory Visit  Name: Tyrese Driscoll II      : 1960      MRN: [id number]  Encounter Provider: Milvia Her PA-C  Encounter Date: 10/7/2024   Encounter department: [facility] UROLOGY [place]  Assessment & Plan  Gross hematuria  Evaluated in ED  for new onset gross hematuria   CT stone study -- No acute pathology. Specifically, no urinary tract calculi or obstructive uropathy. Prostatomegaly   UA shows no obvious signs of infection, no official culture was sent   Re-presented to ED   CT a/p w/ contrast  -- Mild bladder wall thickening which could be on the basis of chronic bladder outlet obstruction from an enlarged prostate, however correlate clinically and with urinalysis to evaluate for possible mild cystitis.   UA - moderate leukocytes, large blood, negative nitrites   CMP -- Creatinine 1.01, GFR 78  Today in office reports hematuria has resolved   PVR 30 ml today in office    Urine dip -- no sign of infection, trace blood  Will be sent for urine cytology  Ultrasound kidney/bladder ordered  Return to office for cystoscopy with MD    Orders:    POCT Measure PVR    Cytology, urine    POCT urine dip      History of Present Illness     Tyrese Driscoll II is a 64 y.o. male who presents to the office as a new patient for new onset gross hematuria. Evaluated in ED  and  for wilda blood in the urine 2.5 weeks in total. Denies previous episodes or trauma to the area. He reported large clots making it difficult to completely empty his bladder. He began feeling very weak, pre-syncopal x4, unable to work. Instructed to go return to ED. CT a/p  -- no acute pathology. Specifically, no urinary tract calculi or obstructive uropathy. Prostatomegaly.  He was initiated on antibiotics for possible UTI. Ceftin and Bactrim did not offer relief. Ciprofloxacin 7 day course seemed to help the most. Gross hematuria has entirely resolved since the beginning of this month. He still reports  ongoing suprapubic pressure, weak stream, and urgency. PVR 30 ml today in office, no sign of retention.  He feels as if his urinary pattern has drastically changed post-UTI. Urine dip today in office shows no sign of infection, trace blood remains. Patient has already undergone CT testing, unremarkable, no lesions/tumors noted. Patient reports both his father and 2 paternal uncles had prostate cancer, no PSA on file.  PSA and urine cytology will be sent out. He will return in 1 month for cystoscopy with MD. Repeat ultrasound of kidney/bladder will be ordered as he still has ongoing bladder pressure. Reach out to office with any new onset symptoms or repeat gross hematuria. All of the patients questions were answered.     Of note: patient brought in [place] paperwork as gross hematuria has caused him to miss multiple days of work. Information faxed into system and sent to clerical staff. We will fill it out and fax it to his employer once approved.     History obtained from : patient  Review of Systems   Constitutional:  Positive for activity change and fatigue. Negative for chills and fever.   Respiratory:  Negative for apnea, cough and shortness of breath.    Cardiovascular:  Negative for chest pain and leg swelling.   Gastrointestinal:  Positive for abdominal pain (bladder pressure). Negative for abdominal distention, constipation, diarrhea, nausea and vomiting.   Genitourinary:  Positive for frequency and urgency. Negative for decreased urine volume, difficulty urinating, dysuria, flank pain, hematuria, penile pain and testicular pain.   Neurological:  Negative for dizziness and headaches.   Psychiatric/Behavioral: Negative.       Medical History Reviewed by provider this encounter:  Tobacco  Allergies  Meds  Problems  Med Hx  Surg Hx  Fam Hx       No current outpatient medications on file prior to visit.     No current facility-administered medications on file prior to visit.      Social History     Tobacco  "Use    Smoking status: Every Day     Current packs/day: 0.50     Types: Cigarettes    Smokeless tobacco: Never   Vaping Use    Vaping status: Never Used   Substance and Sexual Activity    Alcohol use: No     Comment: rarely    Drug use: No    Sexual activity: Yes     Partners: Female           Objective     /84 (BP Location: Left arm, Patient Position: Sitting, Cuff Size: Adult)   Pulse 89   Ht 5' 3\" (1.6 m)   Wt 62.6 kg (138 lb)   SpO2 98%   BMI 24.45 kg/m²   Physical Exam  Vitals and nursing note reviewed.   Constitutional:       General: He is not in acute distress.     Appearance: Normal appearance. He is well-developed. He is not ill-appearing.   HENT:      Head: Normocephalic and atraumatic.   Eyes:      Conjunctiva/sclera: Conjunctivae normal.   Cardiovascular:      Rate and Rhythm: Normal rate and regular rhythm.      Heart sounds: No murmur heard.  Pulmonary:      Effort: Pulmonary effort is normal. No respiratory distress.      Breath sounds: Normal breath sounds.   Abdominal:      General: Abdomen is flat. There is no distension.      Palpations: Abdomen is soft.      Tenderness: There is abdominal tenderness (suprapubic). There is no right CVA tenderness or left CVA tenderness.   Musculoskeletal:         General: No swelling.      Cervical back: Neck supple.   Skin:     General: Skin is warm and dry.      Capillary Refill: Capillary refill takes less than 2 seconds.   Neurological:      Mental Status: He is alert.   Psychiatric:         Mood and Affect: Mood normal.       Results  No results found for: \"PSA\"  Lab Results   Component Value Date    CALCIUM 9.7 09/12/2024    K 3.8 09/12/2024    CO2 23 09/12/2024     09/12/2024    BUN 19 09/12/2024    CREATININE 1.01 09/12/2024     Lab Results   Component Value Date    WBC 9.40 09/12/2024    HGB 15.0 09/12/2024    HCT 43.0 09/12/2024    MCV 93 09/12/2024     09/12/2024       Office Urine Dip  Recent Results (from the past 1 hour(s)) "   POCT Measure PVR    Collection Time: 10/07/24  8:26 AM   Result Value Ref Range    POST-VOID RESIDUAL VOLUME, ML POC 30 mL   ]    Administrative Statements   I have spent a total time of 47 minutes in caring for this patient on the day of the visit/encounter including Diagnostic results, Prognosis, Risks and benefits of tx options, Instructions for management, Patient and family education, Importance of tx compliance, Risk factor reductions, Impressions, Counseling / Coordination of care, Documenting in the medical record, Reviewing / ordering tests, medicine, procedures  , and Obtaining or reviewing history  .

## 2024-10-07 ENCOUNTER — OFFICE VISIT (OUTPATIENT)
Dept: UROLOGY | Facility: HOSPITAL | Age: 64
End: 2024-10-07
Payer: COMMERCIAL

## 2024-10-07 ENCOUNTER — TELEPHONE (OUTPATIENT)
Dept: UROLOGY | Facility: HOSPITAL | Age: 64
End: 2024-10-07

## 2024-10-07 VITALS
BODY MASS INDEX: 24.45 KG/M2 | HEART RATE: 89 BPM | DIASTOLIC BLOOD PRESSURE: 84 MMHG | SYSTOLIC BLOOD PRESSURE: 142 MMHG | HEIGHT: 63 IN | WEIGHT: 138 LBS | OXYGEN SATURATION: 98 %

## 2024-10-07 DIAGNOSIS — R97.20 ELEVATED PSA: Primary | ICD-10-CM

## 2024-10-07 DIAGNOSIS — R31.0 GROSS HEMATURIA: Primary | ICD-10-CM

## 2024-10-07 LAB
POST-VOID RESIDUAL VOLUME, ML POC: 30 ML
SL AMB  POCT GLUCOSE, UA: NORMAL
SL AMB LEUKOCYTE ESTERASE,UA: NORMAL
SL AMB POCT BILIRUBIN,UA: NORMAL
SL AMB POCT BLOOD,UA: NORMAL
SL AMB POCT CLARITY,UA: CLEAR
SL AMB POCT COLOR,UA: YELLOW
SL AMB POCT KETONES,UA: NORMAL
SL AMB POCT NITRITE,UA: NORMAL
SL AMB POCT PH,UA: 5
SL AMB POCT SPECIFIC GRAVITY,UA: 1.01
SL AMB POCT URINE PROTEIN: NORMAL
SL AMB POCT UROBILINOGEN: 0.2

## 2024-10-07 PROCEDURE — 99203 OFFICE O/P NEW LOW 30 MIN: CPT

## 2024-10-07 PROCEDURE — 81002 URINALYSIS NONAUTO W/O SCOPE: CPT

## 2024-10-07 PROCEDURE — 51798 US URINE CAPACITY MEASURE: CPT

## 2024-10-07 PROCEDURE — 88112 CYTOPATH CELL ENHANCE TECH: CPT | Performed by: PATHOLOGY

## 2024-10-07 NOTE — TELEPHONE ENCOUNTER
Patient dropped off [place] paperwork when he came in for appt today with Milvia. He is requesting it be filled out by provider. He has been seen in the ER for urology issues and was asking that he could have forms backdated. I told him that Milvia could not backdate anything if she was not the one who saw him. He would like these forms filled out for today's appt and for the future. Thanks!

## 2024-10-08 NOTE — TELEPHONE ENCOUNTER
Patient was seen in the Ed 9/7 2024 for hematuria - UTI and again 9/12. Presented to the urology office on 10/7 for an office appointment. Patient is requesting [place] paper work filled out.  I do not feel that this is warranted - Please advise

## 2024-10-09 ENCOUNTER — HOSPITAL ENCOUNTER (OUTPATIENT)
Dept: ULTRASOUND IMAGING | Facility: HOSPITAL | Age: 64
Discharge: HOME | End: 2024-10-09
Payer: COMMERCIAL

## 2024-10-09 ENCOUNTER — APPOINTMENT (OUTPATIENT)
Dept: LAB | Facility: HOSPITAL | Age: 64
End: 2024-10-09
Payer: COMMERCIAL

## 2024-10-09 DIAGNOSIS — R31.0 GROSS HEMATURIA: ICD-10-CM

## 2024-10-09 LAB — PSA SERPL-MCNC: 20.45 NG/ML (ref 0–4)

## 2024-10-09 PROCEDURE — 76770 US EXAM ABDO BACK WALL COMP: CPT

## 2024-10-09 PROCEDURE — 36415 COLL VENOUS BLD VENIPUNCTURE: CPT

## 2024-10-09 PROCEDURE — G0103 PSA SCREENING: HCPCS

## 2024-10-09 PROCEDURE — 88112 CYTOPATH CELL ENHANCE TECH: CPT | Performed by: PATHOLOGY

## 2024-10-15 NOTE — TELEPHONE ENCOUNTER
Patient called requesting his PSA and ultrasound results as well as an update on his [facility] paperwork. Please advise.     #: [phone]    PSA, Total Screen        Component  Ref Range & Units 10/9/24 0712   PSA  0.000 - 4.000 ng/mL 20.446 High       US kidney and bladder with pvr       Narrative & Impression   RENAL ULTRASOUND WITH PVR     INDICATION: R31.0: Gross hematuria.     COMPARISON: CT abdomen and pelvis with contrast 9/12/2024     TECHNIQUE: Ultrasound of the retroperitoneum was performed with a curvilinear transducer utilizing volumetric sweeps and still imaging techniques.     FINDINGS:     KIDNEYS:  Symmetric and normal size.  Right kidney: 9.8 x 5.7 x 4.1 cm. Volume 119.4 mL  Left kidney: 10.2 x 4.3 x 5.0 cm. Volume 116.3 mL     Right kidney  Normal echogenicity and contour.  No mass is identified.  No hydronephrosis.  No shadowing calculi.  No perinephric fluid collections.     Left kidney  Normal echogenicity and contour.  No suspicious mass is identified.  Upper pole simple cyst measuring 1.0 cm.  No hydronephrosis.  No shadowing calculi.  No perinephric fluid collections.     URETERS:  Nonvisualized.     BLADDER:  Normally distended.  No focal thickening or mass lesions.  Bilateral ureteral jets detected.  Prevoid: 254.5  Moderate post void residual volume. Measured post void volume in mL: 163.0     ADDITIONAL FINDINGS:  Prostate gland is markedly enlarged and measures 5.8 by 5.7 x 4.7 cm (volume: 80.6 mL)     IMPRESSION:     1. No suspicious renal lesion, nephrolithiasis or hydronephrosis.     2. Marked prostatomegaly     3. Moderate post void residual volume.

## 2024-10-16 NOTE — TELEPHONE ENCOUNTER
Complex patient recently seen in office for gross hematuria. Updated PSA returned significantly elevated at 20.446. Recommend repeat PSA asap, order placed. Please discuss things to avoid prior to repeat PSA testing ie. sexual intercourse, ejaculation, lawn mowing, bike riding, etc. If PSA remains elevated patient should return for in-office biopsy within the next 4-6 weeks.     Ultrasound kidney/bladder shows minimal incomplete emptying as well as enlarged prostate, no mass or lesions seen within the kidney or bladder -- this is encouraging. Urine cytology also returned negative for high grade urothelial carcinoma. Recommend proceeding with cystoscopy as scheduled 11/14/24 with Dr. Hernandez to complete hematuria workup.     [place] paperwork remains in the works, unsure if we are able to sign for past visits or if we will need to reach out to the ED providers -- Will ask Vannessa.

## 2024-10-16 NOTE — TELEPHONE ENCOUNTER
LM for patient to call the office back to discuss imaging and test results.     When patient calls back, please inform on Milvia's note.

## 2024-10-16 NOTE — TELEPHONE ENCOUNTER
Patient returned missed call.     Message from provider was relayed. Pt verbalized understanding. Pt stated that he will have blood work drawn within the next few days.    No further action needed at this time.

## 2024-10-21 ENCOUNTER — APPOINTMENT (OUTPATIENT)
Dept: LAB | Facility: HOSPITAL | Age: 64
End: 2024-10-21
Payer: COMMERCIAL

## 2024-10-21 DIAGNOSIS — R97.20 ELEVATED PSA: ICD-10-CM

## 2024-10-21 LAB — PSA SERPL-MCNC: 11.92 NG/ML (ref 0–4)

## 2024-10-21 PROCEDURE — 84153 ASSAY OF PSA TOTAL: CPT

## 2024-10-21 PROCEDURE — 36415 COLL VENOUS BLD VENIPUNCTURE: CPT

## 2024-10-22 ENCOUNTER — TELEPHONE (OUTPATIENT)
Dept: OTHER | Facility: HOSPITAL | Age: 64
End: 2024-10-22

## 2024-10-22 NOTE — TELEPHONE ENCOUNTER
Patients PSA dropped from 20 to 11 but still remains very elevated, concern for malignancy. Recommend proceeding with transrectal office biopsy within the next 4-6 weeks. Will reach out to clinical staff to assist with scheduling.

## 2024-10-22 NOTE — TELEPHONE ENCOUNTER
Called the patient and left a VM with the below information:    Date: 11/14/2024    Arrival Time: 12:15 PM    Visit Type: INJECTION PG    Location: Urology [place]    We are requesting a call back to confirm this appointment at [phone], thank-you    Once results appt confirmed please bump the 11 f/u and 1115 NP to an AP, neither of those need to see MD, thanks

## 2024-10-22 NOTE — TELEPHONE ENCOUNTER
Appt converted to complex cysto, can have cysto and prostate biopsy in same appt. Patient will need to be informed and be given biopsy prep info. Also new arrival time for appt r/t abx inj. Please confirm results appt as well.

## 2024-10-22 NOTE — TELEPHONE ENCOUNTER
Patient is scheduled for Cysto for gross hematuria at the [place] office with Dr Hernandez - he lives in [place]  - question he   should have a bx on 11/14  as His PSA  is 11

## 2024-10-23 NOTE — TELEPHONE ENCOUNTER
Please refer to my previous telephone note for updated PSA results and relay them to the patient. He has been scheduled for cystoscopy AND prostate biopsy with Dr. Hernandez 11/24.

## 2024-10-23 NOTE — TELEPHONE ENCOUNTER
Patient returning call, I relayed the following:  Milvai Her PA-C   10/22/24  9:41 AM  Note      Patients PSA dropped from 20 to 11 but still remains very elevated, concern for malignancy. Recommend proceeding with transrectal office biopsy within the next 4-6 weeks. Will reach out to clinical staff to assist with scheduling.         Also confirmed the following:  Dr. Hernandez  at the [place] office on 11/14.  @ 1230 for  injections and 1:00 for cysto biopsy has been scheduled.    Reviewed biopsy prep, patient requested that this be mailed to him as it is a lot of information. Address on file confirmed, please mail instructions.

## 2024-10-23 NOTE — TELEPHONE ENCOUNTER
LM for patient that Cystoscopy AND prostate biopsy with Dr. Hernandez  at the [place] office on 11/14.  @ 1230 for  injections and 1:00 for cysto biopsy has been scheduled

## 2024-11-11 RX ORDER — CEFAZOLIN SODIUM 1 G/3ML
1000 INJECTION, POWDER, FOR SOLUTION INTRAMUSCULAR; INTRAVENOUS ONCE
Status: COMPLETED | OUTPATIENT
Start: 2024-11-14 | End: 2024-11-14

## 2024-11-11 NOTE — PROGRESS NOTES
Ambulatory Visit  Name: Tyrese Driscoll II      : 1960      MRN: [id number]  Encounter Provider: Juve Hernandez MD  Encounter Date: 2024   Encounter department: [facility] UROLOGY [place]    Assessment & Plan  Elevated PSA  S/p prostate biopsy today  Follow up as schedule for biopsy results discussion  Orders:    ceFAZolin (ANCEF) injection 1,000 mg    Gross hematuria  Urine cytology negative  Cystoscopy negative for malignancy but does show trilobar hypertrophy of the prostate (likely source of bleeding)  Prostate is 80 grams  Independent interpretation of imaging performed today - no renal masses, no stones, large prostate noted with some bladder wall thickening present         Encounter to discuss test results  I have spent a total time of 30 minutes in caring for this patient on the day of the visit/encounter including Diagnostic results, Prognosis, Risks and benefits of tx options, Instructions for management, Patient and family education, Importance of tx compliance, Risk factor reductions, Impressions, Counseling / Coordination of care, Documenting in the medical record, and Reviewing / ordering tests, medicine, procedures  .              History of Present Illness     Tyrese Driscoll II is a 64 y.o. male who presents for follow up of gross hematuria and elevated psa    The following portions of the patient's history were reviewed and updated as appropriate: allergies, current medications, past family history, past medical history, past social history, past surgical history and problem list.      History obtained from : patient  Review of Systems   Constitutional: Negative.    HENT: Negative.     Eyes: Negative.    Respiratory: Negative.     Cardiovascular: Negative.    Gastrointestinal: Negative.    Endocrine: Negative.    Genitourinary:  Positive for hematuria.   Musculoskeletal: Negative.    Skin: Negative.    Allergic/Immunologic: Negative.    Neurological: Negative.     Hematological: Negative.    Psychiatric/Behavioral: Negative.               Objective     There were no vitals taken for this visit.  Physical Exam  Vitals and nursing note reviewed.   Constitutional:       General: He is not in acute distress.     Appearance: Normal appearance. He is well-developed. He is not ill-appearing, toxic-appearing or diaphoretic.   HENT:      Head: Normocephalic and atraumatic.   Eyes:      General: No scleral icterus.  Pulmonary:      Effort: Pulmonary effort is normal. No respiratory distress.   Abdominal:      General: There is no distension.      Palpations: Abdomen is soft.      Tenderness: There is no abdominal tenderness.   Genitourinary:     Penis: Normal.       Comments: Hemorrhoids present  Musculoskeletal:         General: No deformity.      Cervical back: Neck supple.   Skin:     Coloration: Skin is not jaundiced or pale.   Neurological:      Mental Status: He is alert and oriented to person, place, and time. Mental status is at baseline.      Cranial Nerves: No cranial nerve deficit.      Motor: No weakness.      Coordination: Coordination normal.   Psychiatric:         Mood and Affect: Mood normal.         Behavior: Behavior normal.         Thought Content: Thought content normal.         Judgment: Judgment normal.       Results  Lab Results   Component Value Date    PSA 11.921 (H) 10/21/2024    PSA 20.446 (H) 10/09/2024     Lab Results   Component Value Date    CALCIUM 9.7 09/12/2024    K 3.8 09/12/2024    CO2 23 09/12/2024     09/12/2024    BUN 19 09/12/2024    CREATININE 1.01 09/12/2024     Lab Results   Component Value Date    WBC 9.40 09/12/2024    HGB 15.0 09/12/2024    HCT 43.0 09/12/2024    MCV 93 09/12/2024     09/12/2024       Office Urine Dip  No results found for this or any previous visit (from the past 1 hour(s)).]    Administrative Statements

## 2024-11-14 ENCOUNTER — PROCEDURE VISIT (OUTPATIENT)
Dept: UROLOGY | Facility: CLINIC | Age: 64
End: 2024-11-14
Payer: COMMERCIAL

## 2024-11-14 VITALS
BODY MASS INDEX: 24.1 KG/M2 | SYSTOLIC BLOOD PRESSURE: 124 MMHG | WEIGHT: 136 LBS | HEART RATE: 95 BPM | DIASTOLIC BLOOD PRESSURE: 86 MMHG | HEIGHT: 63 IN | OXYGEN SATURATION: 99 %

## 2024-11-14 DIAGNOSIS — R31.0 GROSS HEMATURIA: ICD-10-CM

## 2024-11-14 DIAGNOSIS — Z71.2 ENCOUNTER TO DISCUSS TEST RESULTS: ICD-10-CM

## 2024-11-14 DIAGNOSIS — R97.20 ELEVATED PSA: Primary | ICD-10-CM

## 2024-11-14 PROCEDURE — 55700 PR PROSTATE NEEDLE BIOPSY ANY APPROACH: CPT | Performed by: UROLOGY

## 2024-11-14 PROCEDURE — 76942 ECHO GUIDE FOR BIOPSY: CPT | Performed by: UROLOGY

## 2024-11-14 PROCEDURE — 52000 CYSTOURETHROSCOPY: CPT | Performed by: UROLOGY

## 2024-11-14 PROCEDURE — 96372 THER/PROPH/DIAG INJ SC/IM: CPT

## 2024-11-14 PROCEDURE — 99214 OFFICE O/P EST MOD 30 MIN: CPT | Performed by: UROLOGY

## 2024-11-14 RX ADMIN — CEFAZOLIN SODIUM 1000 MG: 1 INJECTION, POWDER, FOR SOLUTION INTRAMUSCULAR; INTRAVENOUS at 13:01

## 2024-11-14 NOTE — ASSESSMENT & PLAN NOTE
Urine cytology negative  Cystoscopy negative for malignancy but does show trilobar hypertrophy of the prostate (likely source of bleeding)  Prostate is 80 grams  Independent interpretation of imaging performed today - no renal masses, no stones, large prostate noted with some bladder wall thickening present

## 2024-11-14 NOTE — ASSESSMENT & PLAN NOTE
S/p prostate biopsy today  Follow up as schedule for biopsy results discussion  Orders:    ceFAZolin (ANCEF) injection 1,000 mg

## 2024-11-14 NOTE — PROGRESS NOTES
Office Cystoscopy Procedure Note    Indication:    Hematuria    Informed consent   The risks, benefits, complications, treatment options, and expected outcomes were discussed with the patient. The patient concurred with the proposed plan and provided informed consent.    Anesthesia  Lidocaine jelly 2%    Antibiotic prophylaxis   None    Procedure  The patient was placed in the supineposition, was prepped and draped in the usual manner using sterile technique, and 2% lidocaine jelly instilled into the urethra.  A 17 F flexible cystoscope was then inserted into the urethra and the urethra and bladder carefully examined.  The following findings were noted:    Findings:  Urethra:  Normal  Prostate:  Prominent lateral lobe hypertrophy, moderate median lobe, no lesions  Bladder:  Normal mucosa, no masses  Ureteral orifices:  orthotopic  Other findings:  None, retroflexed view confirms    Specimens: None                 Complications:    None; patient tolerated the procedure well           Disposition: To home            Condition: Stable    Plan: Trilobar hypertrophy of the prostate, no lesions, no malignant findings on cystoscopy       Cystoscopy     Date/Time  11/14/2024 1:00 PM     Performed by  Juve Hernandez MD   Authorized by  Juve Hernandez MD     Universal Protocol:  procedure performed by consultantConsent: Verbal consent obtained. Written consent obtained.  Risks and benefits: risks, benefits and alternatives were discussed  Consent given by: patient  Patient understanding: patient states understanding of the procedure being performed  Patient consent: the patient's understanding of the procedure matches consent given  Procedure consent: procedure consent matches procedure scheduled  Relevant documents: relevant documents present and verified  Test results: test results available and properly labeled  Site marked: the operative site was not marked  Radiology Images displayed and confirmed. If images not  available, report reviewed: imaging studies available  Required items: required blood products, implants, devices, and special equipment available  Patient identity confirmed: verbally with patient and provided demographic data      Procedure Details:  Procedure type: cystoscopy    Patient tolerance: Patient tolerated the procedure well with no immediate complications    Additional Procedure Details: Office Cystoscopy Procedure Note    Indication:    Hematuria    Informed consent   The risks, benefits, complications, treatment options, and expected outcomes were discussed with the patient. The patient concurred with the proposed plan and provided informed consent.    Anesthesia  Lidocaine jelly 2%    Antibiotic prophylaxis   None    Procedure  The patient was placed in the supineposition, was prepped and draped in the usual manner using sterile technique, and 2% lidocaine jelly instilled into the urethra.  A 17 F flexible cystoscope was then inserted into the urethra and the urethra and bladder carefully examined.  The following findings were noted:    Findings:  Urethra:  Normal  Prostate:  Prominent lateral lobe hypertrophy, moderate median lobe, no lesions  Bladder:  Normal mucosa, no masses  Ureteral orifices:  orthotopic  Other findings:  None, retroflexed view confirms    Specimens: None                 Complications:    None; patient tolerated the procedure well           Disposition: To home            Condition: Stable    Plan: Trilobar hypertrophy of the prostate, no lesions, no malignant findings on cystoscopy

## 2024-11-14 NOTE — PROGRESS NOTES
Office TRUS-guided Prostate Biopsy Procedure Note    Indication    Elevated PSA    Informed consent   The risks, benefits and alternatives to this procedures were discussed with the patient and he has participated in the informed consent process and wishes to proceed    Antibiotic prophylaxis   Ancef    Rectal cleansing  The patient was instructed to perform an evacuating rectal enema 1-2 hours prior to biopsy.    Local anesthesia  Topical 2% lidocaine jelly was applied liberally to the anus and rectum and allowed to dwell for at least 5 min prior to starting the procedure.  After insertion of the TRUS probe, 10 mL of 2% lidocaine solution was injected with ultrasound guidance at the  junction of the prostate and seminal vesicles. The anesthetic was allowed to dwell for at least 2 minutes prior to biopsy.    Transrectal ultrasonography  The patient was placed in the left lateral decubitus position. After an attentive digital rectal examination, a 7.5 mHz sidefire ultrasound probe was gently inserted into the rectum and biplanar imaging of the prostate was done with the findings noted below. Images were taken of any abnormal findings and also to document prostate size.    Bladder  The bladder base appeared normal.    Prostate      Ultrasound size measurements:  -Volume:  48 cm3 via TRUS (previousl renal and bladder ultrasound measured 80 grams)    Ultrasound findings:  -Cysts: None  -Masses: None  -Median lobe: present    Clinical stage (assuming a positive biopsy):   -T1c.    TRUS-guided needle biopsy  Using an 18 gauge biopsy needle and ultrasound guidance, the following biopsies were taken:    1 core(s) from the left lateral base.  1 core(s) from the left lateral mid-gland.  1 core(s) from the right middle base.  1 core(s) from the right lateral base.  1 core(s) from the left lateral mid-gland.  1 core(s) from the left middle mid-gland.  1 core(s) from the right middle mid-gland.  1 core(s) from the right lateral  mid-gland.  1 core(s) from the left lateral apex.  1 core(s) from the left middle apex.  1 core(s) from the right middle apex.  1 core(s) from the right lateral apex    Total number of cores: 12                Complications  There were no procedural complications.    Disposition  The patient was dismissed to home     Post-procedure instructions:     Today he underwent an uncomplicated transrectal ultrasound-guided biopsy of the prostate, following a castillo-prostatic nerve block.I reviewed the normal post-procedure course including bleeding per rectum, hematuria, and hematospermia.  . Instructed him to call with fever greater than 101, chills, nausea, vomiting, and poorly controlled pain. His followup was scheduled in 2 to 4 weeks' time to review the pathology.        Biopsy prostate     Date/Time  11/14/2024 1:00 PM     Performed by  Juve Hernandez MD   Authorized by  Juve Hernandez MD     Universal Protocol   procedure performed by consultantConsent: Verbal consent obtained. Written consent obtained.  Risks and benefits: risks, benefits and alternatives were discussed  Consent given by: patient  Patient understanding: patient states understanding of the procedure being performed  Patient consent: the patient's understanding of the procedure matches consent given  Procedure consent: procedure consent matches procedure scheduled  Relevant documents: relevant documents present and verified  Test results: test results available and properly labeled  Site marked: the operative site was not marked  Radiology Images displayed and confirmed. If images not available, report reviewed: imaging studies available  Required items: required blood products, implants, devices, and special equipment available  Patient identity confirmed: verbally with patient and provided demographic data      Local anesthesia used: yes      Anesthesia: local infiltration     Anesthesia   Local anesthesia used: yes  Local Anesthetic: lidocaine 1%  without epinephrine     Sedation   Patient sedated: no        Specimen: yes    Culture: no   Procedure Details   Procedure Notes: Office TRUS-guided Prostate Biopsy Procedure Note    Indication    Elevated PSA    Informed consent   The risks, benefits and alternatives to this procedures were discussed with the patient and he has participated in the informed consent process and wishes to proceed    Antibiotic prophylaxis   Ancef    Rectal cleansing  The patient was instructed to perform an evacuating rectal enema 1-2 hours prior to biopsy.    Local anesthesia  Topical 2% lidocaine jelly was applied liberally to the anus and rectum and allowed to dwell for at least 5 min prior to starting the procedure.  After insertion of the TRUS probe, 10 mL of 2% lidocaine solution was injected with ultrasound guidance at the  junction of the prostate and seminal vesicles. The anesthetic was allowed to dwell for at least 2 minutes prior to biopsy.    Transrectal ultrasonography  The patient was placed in the left lateral decubitus position. After an attentive digital rectal examination, a 7.5 mHz sidefire ultrasound probe was gently inserted into the rectum and biplanar imaging of the prostate was done with the findings noted below. Images were taken of any abnormal findings and also to document prostate size.    Bladder  The bladder base appeared normal.    Prostate      Ultrasound size measurements:  -Volume:  48 cm3 via TRUS (previousl renal and bladder ultrasound measured 80 grams)    Ultrasound findings:  -Cysts: None  -Masses: None  -Median lobe: present    Clinical stage (assuming a positive biopsy):   -T1c.    TRUS-guided needle biopsy  Using an 18 gauge biopsy needle and ultrasound guidance, the following biopsies were taken:    1 core(s) from the left lateral base.  1 core(s) from the left lateral mid-gland.  1 core(s) from the right middle base.  1 core(s) from the right lateral base.  1 core(s) from the left lateral  mid-gland.  1 core(s) from the left middle mid-gland.  1 core(s) from the right middle mid-gland.  1 core(s) from the right lateral mid-gland.  1 core(s) from the left lateral apex.  1 core(s) from the left middle apex.  1 core(s) from the right middle apex.  1 core(s) from the right lateral apex    Total number of cores: 12                Complications  There were no procedural complications.    Disposition  The patient was dismissed to home     Post-procedure instructions:     Today he underwent an uncomplicated transrectal ultrasound-guided biopsy of the prostate, following a castillo-prostatic nerve block.I reviewed the normal post-procedure course including bleeding per rectum, hematuria, and hematospermia.  . Instructed him to call with fever greater than 101, chills, nausea, vomiting, and poorly controlled pain. His followup was scheduled in 2 to 4 weeks' time to review the pathology.  Patient Transportation: confirmed  Patient tolerance: patient tolerated the procedure well with no immediate complications

## 2024-11-14 NOTE — ASSESSMENT & PLAN NOTE
I have spent a total time of 30 minutes in caring for this patient on the day of the visit/encounter including Diagnostic results, Prognosis, Risks and benefits of tx options, Instructions for management, Patient and family education, Importance of tx compliance, Risk factor reductions, Impressions, Counseling / Coordination of care, Documenting in the medical record, and Reviewing / ordering tests, medicine, procedures  .

## 2024-12-02 NOTE — PROGRESS NOTES
Name: Tyrese Driscoll II      : 1960      MRN: [id number]  Encounter Provider: Juve Hernandez MD  Encounter Date: 12/3/2024   Encounter department: [facility] UROLOGY [place]  :  Assessment & Plan  Prostate cancer (HCC)  Today we discussed the patient's pathology from his prostate biopsy including definition of prostate cancer risk groups, a discussion of the [place] score/Grade groups, and the meaning of his biopsy results in terms of his future management and overall health and treatment.    I had a discussion with the patient regarding the natural history and treatment options for prostate cancer and the potential need for multimodal treatments.  Active surveillance, surgical excision in the form of open or robotic surgery, and radiation therapies plus or minus androgen deprivation therapy were discussed at length with the patient. With regard to multimodal therapy discussion this could include surgical excision with postoperative radiation therapy (RT) +/-androgen deprivation therapy (ADT) if adverse pathology or RT with long-term ADT.     I discussed robot assisted laparoscopic radical prostatectomy with the inclusion or exclusion of bilateral pelvic lymph node dissection (depending on disease parameters and intraoperative findings) in depth with the patient.      I discussed that surgery has potential advantages compared with radiation including the more accurate prognostic, pathologic information afforded by the surgical pathology, the more immediate indication of effective treatment as indicated by an undetectable PSA, as well as the relatively easier use of adjuvant or salvage radiotherapy postoperatively if the need arises.     I discussed that in comparison with open surgery that robotic prostatectomy has the benefits of improved convalescence and decreased blood loss and appears to have equivalent cancer control rates and quality-of-life side effects such similar rates of urinary  incontinence and erectile dysfunction.     I discussed that most men stay in the hospital for 1-2 days and go home with a catheter for 7-10 days. I discussed the side effects of surgery including stress incontinence, and I counseled him that most men leak urine immediately after surgery. I expect a stress incontinence rate at six months in the 10%-15% range, improving to 5%-10% in one year.  I did communicate that there additional treatments for urinary incontinence that may be persistent in the post-prostatectomy setting.    In terms of erectile dysfunction, we discussed the normal physiology of the erectile response as well as discussing the anatomy of the cavernous nerves.  I drew a diagram for him outlining the pelvic anatomy with regard to the bladder, prostate, and parasympathetic nerve plexus running laterally to the prostate.  I will do everything that I safely can to attempt a nerve-sparing procedure if deemed to be appropriate intraoperatively.  I did discuss with him that my priority is 1st to rid him of his cancer, 2nd to keep him dry, and 3rd to keep him with sexual function.  He does understand that his sexual function postoperatively is a function of his current sexual function preoperatively and is closely tied to his overall general health.  He understands that there are other therapies for erectile dysfunction the post-prostatectomy setting including oral therapies, injections, and penile prosthesis placement. Additionally, I did stress to him that his post-operative sexual function will not be as good as his preoperative sexual function due to the nature of prostate surgery. We discussed that men are surgically sterile after radical prostatectomy    I discussed other surgical complications including, but not limited to, a bladder neck contracture rate in the 5%-10% range any time after surgery, a rectal injury rate of 1% or less, an open conversion rate in the 1% range, a lymphocele causing  symptoms and/or requiring intervention in the 5% range, and a transfusion rate in the 1% to 2% range. Additionally, he may experience some degree of penile remodeling and/or loss of penile length after surgery.    He has participated in shared/informed decision-making model with regard to this major urologic surgery with risk.  He wishes to avoid TURP and radical prostatectomy at this time.    I will see him back in 6 months with a PSA prior.  He will require an MRI of the prostate at some point in the future.  We will plan to repeat a per protocol prostate biopsy in roughly 1 year or so    Orders:    PSA Total, Diagnostic; Future          History of Present Illness   Tyrese Driscoll II is a 64 y.o. male who presents for follow-up status post prostate biopsy.  This shows 1 core of [place] 6 prostate cancer and some other abnormal cores with pending a second opinion on pathology for [facility]  ECOG 0  I spoke with him about his options of active surveillance, his option of outlet surgery such as TURP or simple prostatectomy followed by active surveillance, and the option of radical prostatectomy.  He did discuss major urologic surgery with risk factors today.  He wishes to proceed to active surveillance and this is very reasonable.    The following portions of the patient's history were reviewed and updated as appropriate: allergies, current medications, past family history, past medical history, past social history, past surgical history and problem list.    Review of Systems   Constitutional: Negative.    HENT: Negative.     Eyes: Negative.    Respiratory: Negative.     Cardiovascular: Negative.    Gastrointestinal: Negative.    Endocrine: Negative.    Genitourinary: Negative.    Musculoskeletal: Negative.    Skin: Negative.    Allergic/Immunologic: Negative.    Neurological: Negative.    Hematological: Negative.    Psychiatric/Behavioral: Negative.            Objective   There were no  vitals taken for this visit.    Physical Exam  Vitals and nursing note reviewed.   Constitutional:       General: He is not in acute distress.     Appearance: Normal appearance. He is well-developed. He is not ill-appearing, toxic-appearing or diaphoretic.   HENT:      Head: Normocephalic and atraumatic.   Eyes:      General: No scleral icterus.  Pulmonary:      Effort: Pulmonary effort is normal. No respiratory distress.   Abdominal:      General: There is no distension.      Palpations: Abdomen is soft.      Tenderness: There is no abdominal tenderness.   Musculoskeletal:         General: No deformity.      Cervical back: Neck supple.   Skin:     Coloration: Skin is not jaundiced or pale.   Neurological:      Mental Status: He is alert and oriented to person, place, and time. Mental status is at baseline.      Cranial Nerves: No cranial nerve deficit.      Motor: No weakness.      Coordination: Coordination normal.   Psychiatric:         Mood and Affect: Mood normal.         Behavior: Behavior normal.         Thought Content: Thought content normal.         Judgment: Judgment normal.          Results  Lab Results   Component Value Date    PSA 11.921 (H) 10/21/2024    PSA 20.446 (H) 10/09/2024     Lab Results   Component Value Date    CALCIUM 9.7 09/12/2024    K 3.8 09/12/2024    CO2 23 09/12/2024     09/12/2024    BUN 19 09/12/2024    CREATININE 1.01 09/12/2024     Lab Results   Component Value Date    WBC 9.40 09/12/2024    HGB 15.0 09/12/2024    HCT 43.0 09/12/2024    MCV 93 09/12/2024     09/12/2024       Office Urine Dip  No results found for this or any previous visit (from the past hour).]

## 2024-12-03 ENCOUNTER — OFFICE VISIT (OUTPATIENT)
Dept: UROLOGY | Facility: HOSPITAL | Age: 64
End: 2024-12-03
Payer: COMMERCIAL

## 2024-12-03 VITALS
WEIGHT: 140 LBS | OXYGEN SATURATION: 98 % | BODY MASS INDEX: 24.8 KG/M2 | HEART RATE: 84 BPM | HEIGHT: 63 IN | SYSTOLIC BLOOD PRESSURE: 140 MMHG | DIASTOLIC BLOOD PRESSURE: 80 MMHG

## 2024-12-03 DIAGNOSIS — C61 PROSTATE CANCER (HCC): Primary | ICD-10-CM

## 2024-12-03 PROCEDURE — 99215 OFFICE O/P EST HI 40 MIN: CPT | Performed by: UROLOGY

## 2024-12-09 PROCEDURE — 88344 IMHCHEM/IMCYTCHM EA MLT ANTB: CPT | Performed by: PATHOLOGY

## 2024-12-09 PROCEDURE — G0416 PROSTATE BIOPSY, ANY MTHD: HCPCS | Performed by: PATHOLOGY

## 2025-07-31 ENCOUNTER — HOSPITAL ENCOUNTER (EMERGENCY)
Facility: HOSPITAL | Age: 65
Discharge: HOME | End: 2025-07-31
Attending: EMERGENCY MEDICINE | Admitting: EMERGENCY MEDICINE
Payer: COMMERCIAL

## 2025-07-31 ENCOUNTER — APPOINTMENT (EMERGENCY)
Dept: RADIOLOGY | Facility: HOSPITAL | Age: 65
End: 2025-07-31
Payer: COMMERCIAL

## 2025-07-31 VITALS
DIASTOLIC BLOOD PRESSURE: 82 MMHG | SYSTOLIC BLOOD PRESSURE: 136 MMHG | RESPIRATION RATE: 18 BRPM | OXYGEN SATURATION: 99 % | TEMPERATURE: 98.7 F | HEART RATE: 70 BPM

## 2025-07-31 DIAGNOSIS — R07.9 CHEST PAIN: Primary | ICD-10-CM

## 2025-07-31 DIAGNOSIS — T67.9XXA: ICD-10-CM

## 2025-07-31 LAB
2HR DELTA HS TROPONIN: 0 NG/L
ALBUMIN SERPL BCG-MCNC: 4.4 G/DL (ref 3.5–5)
ALP SERPL-CCNC: 47 U/L (ref 34–104)
ALT SERPL W P-5'-P-CCNC: 19 U/L (ref 7–52)
ANION GAP SERPL CALCULATED.3IONS-SCNC: 6 MMOL/L (ref 4–13)
APTT PPP: 28 SECONDS (ref 23–34)
AST SERPL W P-5'-P-CCNC: 21 U/L (ref 13–39)
ATRIAL RATE: 64 BPM
BASOPHILS # BLD AUTO: 0.01 THOUSANDS/ÂΜL (ref 0–0.1)
BASOPHILS NFR BLD AUTO: 0 % (ref 0–1)
BILIRUB SERPL-MCNC: 0.69 MG/DL (ref 0.2–1)
BUN SERPL-MCNC: 24 MG/DL (ref 5–25)
CALCIUM SERPL-MCNC: 8.8 MG/DL (ref 8.4–10.2)
CARDIAC TROPONIN I PNL SERPL HS: 4 NG/L (ref ?–50)
CARDIAC TROPONIN I PNL SERPL HS: 4 NG/L (ref ?–50)
CHLORIDE SERPL-SCNC: 109 MMOL/L (ref 96–108)
CO2 SERPL-SCNC: 25 MMOL/L (ref 21–32)
CREAT SERPL-MCNC: 0.89 MG/DL (ref 0.6–1.3)
EOSINOPHIL # BLD AUTO: 0.09 THOUSAND/ÂΜL (ref 0–0.61)
EOSINOPHIL NFR BLD AUTO: 1 % (ref 0–6)
ERYTHROCYTE [DISTWIDTH] IN BLOOD BY AUTOMATED COUNT: 13 % (ref 11.6–15.1)
GFR SERPL CREATININE-BSD FRML MDRD: 89 ML/MIN/1.73SQ M
GLUCOSE SERPL-MCNC: 125 MG/DL (ref 65–140)
HCT VFR BLD AUTO: 40 % (ref 36.5–49.3)
HGB BLD-MCNC: 13.3 G/DL (ref 12–17)
IMM GRANULOCYTES # BLD AUTO: 0.02 THOUSAND/UL (ref 0–0.2)
IMM GRANULOCYTES NFR BLD AUTO: 0 % (ref 0–2)
INR PPP: 0.92 (ref 0.85–1.19)
LYMPHOCYTES # BLD AUTO: 1.45 THOUSANDS/ÂΜL (ref 0.6–4.47)
LYMPHOCYTES NFR BLD AUTO: 22 % (ref 14–44)
MCH RBC QN AUTO: 31.8 PG (ref 26.8–34.3)
MCHC RBC AUTO-ENTMCNC: 33.3 G/DL (ref 31.4–37.4)
MCV RBC AUTO: 96 FL (ref 82–98)
MONOCYTES # BLD AUTO: 0.53 THOUSAND/ÂΜL (ref 0.17–1.22)
MONOCYTES NFR BLD AUTO: 8 % (ref 4–12)
NEUTROPHILS # BLD AUTO: 4.43 THOUSANDS/ÂΜL (ref 1.85–7.62)
NEUTS SEG NFR BLD AUTO: 69 % (ref 43–75)
NRBC BLD AUTO-RTO: 0 /100 WBCS
P AXIS: 73 DEGREES
PLATELET # BLD AUTO: 281 THOUSANDS/UL (ref 149–390)
PMV BLD AUTO: 8.5 FL (ref 8.9–12.7)
POTASSIUM SERPL-SCNC: 3.9 MMOL/L (ref 3.5–5.3)
PR INTERVAL: 128 MS
PROT SERPL-MCNC: 6.5 G/DL (ref 6.4–8.4)
PROTHROMBIN TIME: 12.8 SECONDS (ref 12.3–15)
QRS AXIS: 34 DEGREES
QRSD INTERVAL: 104 MS
QT INTERVAL: 402 MS
QTC INTERVAL: 415 MS
RBC # BLD AUTO: 4.18 MILLION/UL (ref 3.88–5.62)
SODIUM SERPL-SCNC: 140 MMOL/L (ref 135–147)
T WAVE AXIS: 55 DEGREES
VENTRICULAR RATE: 64 BPM
WBC # BLD AUTO: 6.53 THOUSAND/UL (ref 4.31–10.16)

## 2025-07-31 PROCEDURE — 96360 HYDRATION IV INFUSION INIT: CPT

## 2025-07-31 PROCEDURE — 99285 EMERGENCY DEPT VISIT HI MDM: CPT | Performed by: PHYSICIAN ASSISTANT

## 2025-07-31 PROCEDURE — 85730 THROMBOPLASTIN TIME PARTIAL: CPT | Performed by: PHYSICIAN ASSISTANT

## 2025-07-31 PROCEDURE — 36415 COLL VENOUS BLD VENIPUNCTURE: CPT | Performed by: PHYSICIAN ASSISTANT

## 2025-07-31 PROCEDURE — 80053 COMPREHEN METABOLIC PANEL: CPT | Performed by: PHYSICIAN ASSISTANT

## 2025-07-31 PROCEDURE — 71046 X-RAY EXAM CHEST 2 VIEWS: CPT

## 2025-07-31 PROCEDURE — 93010 ELECTROCARDIOGRAM REPORT: CPT | Performed by: INTERNAL MEDICINE

## 2025-07-31 PROCEDURE — 93005 ELECTROCARDIOGRAM TRACING: CPT

## 2025-07-31 PROCEDURE — 84484 ASSAY OF TROPONIN QUANT: CPT | Performed by: PHYSICIAN ASSISTANT

## 2025-07-31 PROCEDURE — 85025 COMPLETE CBC W/AUTO DIFF WBC: CPT | Performed by: PHYSICIAN ASSISTANT

## 2025-07-31 PROCEDURE — 85610 PROTHROMBIN TIME: CPT | Performed by: PHYSICIAN ASSISTANT

## 2025-07-31 PROCEDURE — 99284 EMERGENCY DEPT VISIT MOD MDM: CPT

## 2025-07-31 RX ADMIN — SODIUM CHLORIDE 500 ML: 0.9 INJECTION, SOLUTION INTRAVENOUS at 12:16
